# Patient Record
(demographics unavailable — no encounter records)

---

## 2024-11-15 NOTE — HISTORY OF PRESENT ILLNESS
[FreeTextEntry1] : 45-year-old male with a history of hypertension, type 2 diabetes, nonalcoholic steatohepatitis, osteomyelitis, presents for follow-up management of type 2 diabetes mellitus. Prior history of ankle and knee surgeries.  Type 2 diabetes: Patient was initially diagnosed in 2019 with type 2 diabetes, he initially was on homeopathic treatment for diabetes.  A1c trends: A1c February 2022 6.2%, A1c September 2022 to 6%. A1c 8/7/23: 5.6% A1c 1/19/24: 5.5% may 2024 5.7  A1c 8/12/24: 5.5% nov 2024- 5.6  states to july to sept was fasting- lost his grandmas and 45 days did not eat for spiritual reasons  did not take mounjaro at that time  had lows if not eating - this was not related to mounajro   states lowest was 50s  in office = today 66 cgm  had ensure and avocado toast   Current medications: Mounjaro 12.5 mg once weekly. - has gi issues in between, takes herbs, fibers, bloating    Previous Medications: Ozempic when there was a medication shortage with Mounjaro.  Metformin 2000 mg daily. Ozempic was not covered.  Weight trend: 204 pounds--> 186 pounds. -->187lbs  Glucose monitoring: Alfie 99% low 1% intermittent lows noted to 65-70s   Fatty liver: Previously with elevated liver function studies which were repeated and normal.  The patient has seen hepatology in the past and was diagnosed with fatty liver.  His weight is 207 pounds--->203---> 189 pounds.   Diet: Vegetarian. Has made major dietary changes. No more soda, no more half and half.  Ophthalmology exam: No previous  history of diabetic retinopathy, follows annually Hyperlipidemia: Atorvastatin 10 mg daily  Hypertension: Norvasc, patient was previously on losartan but was taken off due to low blood pressure.  he has hx of fatty liver, he had seen GI but its unclear if specifc intervention or workup for fatty liver was discussed i d/w pt to talk to his GI re: this and if not worked up than to be referred to hepatolgist    Family history: Father with coronary artery disease and diabetes mellitus, mother with type 2 diabetes. SH: works at [employer]

## 2024-11-15 NOTE — ASSESSMENT
[Diabetes Foot Care] : diabetes foot care [Long Term Vascular Complications] : long term vascular complications of diabetes [Carbohydrate Consistent Diet] : carbohydrate consistent diet [Self Monitoring of Blood Glucose] : self monitoring of blood glucose [Retinopathy Screening] : Patient was referred to ophthalmology for retinopathy screening [Diabetic Medications] : Risks and benefits of diabetic medications were discussed [FreeTextEntry3] : Risks and benefits of incretin mimetic therapy were discussed with the patient including nausea,  pancreatitis and potential risk of medullary thyroid cancer. MTC seen in rats. no MEN in the family.  Discussed GLP-1 agonists. We discussed the pros and cons of this class of medications, including the benefits of glucose lowering,weight loss, cardiovascular and renal benefits and the warnings for pancreatitis, medullary thyroid cancer, and acute kidney injury.   We discussed the pros and cons of this class of medications, including the benefits of glucose lowering,weight loss, cardiovascular and renal benefits and the warnings for pancreatitis, medullary thyroid cancer, and acute kidney injury. Patient is agreeable with starting a medication of this class. [FreeTextEntry1] : 45-year-old male with a history of hypertension, type 2 diabetes, nonalcoholic steatohepatitis, osteomyelitis, presents for follow-up management of type 2 diabetes mellitus. Prior history of ankle and knee surgeries.  Type 2 diabetes: Patient was initially diagnosed in 2019 with type 2 diabetes    1. Type 2 DM: A1c stable, 5.6 has lows, but not symptomatic     Plan: Mounjaro 12.5 mg once weekly.  we will continue with this for weight loss  BMI 29   Note, the patient previously did not tolerate Ozempic and has significant constipation and bloating. Patient referred to GI for altered bowel habits in the event things do not resolve. Also advised on fiber in the diet to mitigate constipation.   states consitpation better now, has bloating taking herbs for bloating. d/w pt caution with herbs, d/w GI if herbs are safe and OKAY with liver   DM HCM a. HTN  -BP goal - <130/80 -pt is on ace/arb: none - negative urine microalbumin ratio, goal <30 mg/g, negative 2024  -b. HLD - pt is on STATIN: now on Atorvastatin 10mg  c. Ophtho/podiatry follow-up - Ophthalmology annually  2. Vitamin D Deficiency: Level was low as per patient   3. CHANEY/.MASLD d/w pt again today to discuss with his GI re: CHANEY and workup and monitoring for the same  pt will touch base with GI to see them vs hepatology .     4. TSH tsh trend <1 but in normal range follow q6 months  if  remains on lower end consider antibodies  would test off supplements  __________________________________________________________________________________________ Continue GLP for benefit with CHANEY. We discussed the pros and cons of this class of medications, including the benefits of glucose lowering, weight loss, cardiovascular and renal benefits and the warnings for pancreatitis, medullary thyroid cancer, and acute kidney injury. Patient is agreeable with continuing a medication of this class.  pt will have more frequent ophthalmology exam to follow diabetic retinopathy discussed that this is a potential side effect that seen with GLP-1 (not direct cause and effect but if worsening on GLP-1 will need to stop it, dw pt this is more due to potential glucose lowering effect (seen with lantus and insulin as well) and that Diabetes itself is associated with DR if DR is noted, will recommend pt follow closely with optho for monitoring q3-6 months as per optho discretion discussed DR risk with glp1 no MEN or MTC in family or pancreatitis  I counseled that patient that if severe abdominal pain and nausea and vomiting occurs, the pt should stop GLP and go to the ER. We also discussed the more mild side effect of abdominal discomfort/nausea with GLP initiation, which should improve over time. The patient was instructed to let me know if the side effects are intolerable/do not improve.  long discussion on dietary choices and more vegetables in vegetarian south  diet Patient was also made aware of the physiological implications of poor glycemic control (micro and macrovascular complications including but not related to stroke, CAD, retinopathy, renal disease) -Risk of untreated diabetes including vision loss, stroke, heart attack and loss of limbs has been discussed with the pt in detail -targets for weight, A1c and FS have been discussed with pt the importance of checking blood glucose and goals fasting and premeals discussed and how this information can be used to help direct insulin and meal choices diet education; we discussed carb counting, label reading, meal planning, pre-prandial and post-prandial finger stick goals especially in relation to food (for example to check pre and post meal FSBG after the largest meal to better monitor and change dietary choices) -FS goals have been reviewed with the pt in detail: AM <130, premeals< 140, and post meal, 160-180 Exercise and healthy eating has been discussed with the pt and its importance in the management of diabetes -we had a lengthy discussion regarding healthy diet (consistent carbohydrates and low calorie content) with the patient. we also emphasized to maintain routine exercise activity (30 minutes daily or 150 minutes in the week) as tolerated. -we also discussed carbs last- eat vegetables and protein prior to carbs which will help control post prandial glucose rise and can even lower insulin levels.

## 2025-02-10 NOTE — ASSESSMENT
[Diabetes Foot Care] : diabetes foot care [Long Term Vascular Complications] : long term vascular complications of diabetes [Carbohydrate Consistent Diet] : carbohydrate consistent diet [Self Monitoring of Blood Glucose] : self monitoring of blood glucose [Retinopathy Screening] : Patient was referred to ophthalmology for retinopathy screening [Diabetic Medications] : Risks and benefits of diabetic medications were discussed [FreeTextEntry3] : Risks and benefits of incretin mimetic therapy were discussed with the patient including nausea,  pancreatitis and potential risk of medullary thyroid cancer. MTC seen in rats. no MEN in the family.  Discussed GLP-1 agonists. We discussed the pros and cons of this class of medications, including the benefits of glucose lowering,weight loss, cardiovascular and renal benefits and the warnings for pancreatitis, medullary thyroid cancer, and acute kidney injury.   We discussed the pros and cons of this class of medications, including the benefits of glucose lowering,weight loss, cardiovascular and renal benefits and the warnings for pancreatitis, medullary thyroid cancer, and acute kidney injury. Patient is agreeable with starting a medication of this class. [FreeTextEntry1] : 45-year-old male with a history of hypertension, type 2 diabetes, nonalcoholic steatohepatitis, osteomyelitis, presents for follow-up management of type 2 diabetes mellitus. Prior history of ankle and knee surgeries.  Type 2 diabetes: Patient was initially diagnosed in 2019 with type 2 diabetes  1. Type 2 DM: A1c stable, 5.6 11/2024. Due for A1c.   Plan: Increase to Mounjaro 15 mg once weekly.   Note, the patient previously did not tolerate Ozempic and has significant constipation and bloating. Patient referred to GI for altered bowel habits in the event things do not resolve. Also advised on fiber in the diet to mitigate constipation.   Constipation and bloating has improved. Taking herbs for bloating. d/w pt caution with herbs, d/w GI if herbs are safe and okay with liver.  DM HCM a. HTN  -BP goal - <130/80 -pt is on ace/arb: none - negative urine microalbumin ratio, goal <30 mg/g, negative 2024  -b. HLD - pt is on STATIN: now on Atorvastatin 10mg  c. Ophtho/podiatry follow-up - Ophthalmology annually  2. Vitamin D Deficiency: Level was low as per patient  3. CHANEY/.MASLD Previously with elevated liver function studies which were repeated and normal.  The patient has seen hepatology in the past and was diagnosed with fatty liver.   Has had a consult with gastroenterology but unclear if there are any specific interventions or workup for fatty liver was discussed.  Patient will be referred to hepatologist if needed.  4. TSH tsh trend <1 but in normal range follow q6 months  if  remains on lower end consider antibodies  would test off supplements.    I spent over 20 minutes total time with this patient encounter including before, during and after time spent with patient in exam room. Time was spent carefully reviewing the chart in preparing to see patients and performing history & physical exam and ordering appropriate testing and documenting in the medial record.  Additional time was spent counseling the patient regarding my findings and recommendations, recent & current test results as available, any pertinent prior medical records available for review including prior visits, and patients' disease state including risk factor medication. __________________________________________________________________________________________ Continue GLP for benefit with CHANEY. We discussed the pros and cons of this class of medications, including the benefits of glucose lowering, weight loss, cardiovascular and renal benefits and the warnings for pancreatitis, medullary thyroid cancer, and acute kidney injury. Patient is agreeable with continuing a medication of this class.  pt will have more frequent ophthalmology exam to follow diabetic retinopathy discussed that this is a potential side effect that seen with GLP-1 (not direct cause and effect but if worsening on GLP-1 will need to stop it, dw pt this is more due to potential glucose lowering effect (seen with lantus and insulin as well) and that Diabetes itself is associated with DR if DR is noted, will recommend pt follow closely with optho for monitoring q3-6 months as per optho discretion discussed DR risk with glp1 no MEN or MTC in family or pancreatitis  I counseled that patient that if severe abdominal pain and nausea and vomiting occurs, the pt should stop GLP and go to the ER. We also discussed the more mild side effect of abdominal discomfort/nausea with GLP initiation, which should improve over time. The patient was instructed to let me know if the side effects are intolerable/do not improve.  long discussion on dietary choices and more vegetables in vegetarian south  diet Patient was also made aware of the physiological implications of poor glycemic control (micro and macrovascular complications including but not related to stroke, CAD, retinopathy, renal disease) -Risk of untreated diabetes including vision loss, stroke, heart attack and loss of limbs has been discussed with the pt in detail -targets for weight, A1c and FS have been discussed with pt the importance of checking blood glucose and goals fasting and premeals discussed and how this information can be used to help direct insulin and meal choices diet education; we discussed carb counting, label reading, meal planning, pre-prandial and post-prandial finger stick goals especially in relation to food (for example to check pre and post meal FSBG after the largest meal to better monitor and change dietary choices) -FS goals have been reviewed with the pt in detail: AM <130, premeals< 140, and post meal, 160-180 Exercise and healthy eating has been discussed with the pt and its importance in the management of diabetes -we had a lengthy discussion regarding healthy diet (consistent carbohydrates and low calorie content) with the patient. we also emphasized to maintain routine exercise activity (30 minutes daily or 150 minutes in the week) as tolerated. -we also discussed carbs last- eat vegetables and protein prior to carbs which will help control post prandial glucose rise and can even lower insulin levels.

## 2025-02-10 NOTE — HISTORY OF PRESENT ILLNESS
[Home] : at home, [unfilled] , at the time of the visit. [Medical Office: ([facility])___] : at the medical office located in  [Telehealth (audio & video)] : This visit was provided via telehealth using real-time 2-way audio visual technology. [Verbal consent obtained from patient] : the patient, [unfilled] [FreeTextEntry1] : 45-year-old male with a history of hypertension, type 2 diabetes, nonalcoholic steatohepatitis, osteomyelitis, presents for follow-up management of type 2 diabetes mellitus. Prior history of ankle and knee surgeries.  Type 2 diabetes: Patient was initially diagnosed in 2019 with type 2 diabetes, he initially was on homeopathic treatment for diabetes.  A1c trends: A1c February 2022 6.2%, A1c September 2022 to 6%. A1c 8/7/23: 5.6% A1c 1/19/24: 5.5% may 2024 5.7  A1c 8/12/24: 5.5% nov 2024- 5.6 A1c 2/2024: DUE  Current medications: Mounjaro 12.5 mg once weekly. GI symptoms have improved with increasing natural fiber.   Previous Medications: Ozempic when there was a medication shortage with Mounjaro.  Metformin 2000 mg daily. Ozempic was not covered.  Weight trend: 204 pounds--> 186 pounds. -->187lbs--> 200 pounds now. He notes an increase, and that the last weight decrease was due to fasting.   Glucose monitoring: Finished glucose sensor, had malfunctions, now doing fingersticks.   Fatty liver: Previously with elevated liver function studies which were repeated and normal.  The patient has seen hepatology in the past and was diagnosed with fatty liver.   Has had a consult with gastroenterology but unclear if there are any specific interventions or workup for fatty liver was discussed.  Patient will be referred to hepatologist if needed.  Diet: Vegetarian. Has made major dietary changes. No more soda, no more half and half. He has incorporated fiber into diet.   Ophthalmology exam: No previous  history of diabetic retinopathy, follows annually Hyperlipidemia: Atorvastatin 10 mg daily  Hypertension: Norvasc, patient was previously on losartan but was taken off due to low blood pressure.  Family history: Father with coronary artery disease and diabetes mellitus, mother with type 2 diabetes. SH: works at [employer]

## 2025-05-09 NOTE — PHYSICAL EXAM
[Alert] : alert [No Respiratory Distress] : no respiratory distress [Normal Rate and Effort] : normal respiratory rate and effort [Clear to Auscultation] : lungs were clear to auscultation bilaterally [Normal S1, S2] : normal S1 and S2 [Normal Rate] : heart rate was normal [Regular Rhythm] : with a regular rhythm [Normal Gait] : normal gait [No Motor Deficits] : the motor exam was normal [No Tremors] : no tremors [Oriented x3] : oriented to person, place, and time [Normal Affect] : the affect was normal [Normal Insight/Judgement] : insight and judgment were intact [Normal Mood] : the mood was normal

## 2025-05-09 NOTE — ASSESSMENT
[Diabetes Foot Care] : diabetes foot care [Long Term Vascular Complications] : long term vascular complications of diabetes [Carbohydrate Consistent Diet] : carbohydrate consistent diet [Self Monitoring of Blood Glucose] : self monitoring of blood glucose [Retinopathy Screening] : Patient was referred to ophthalmology for retinopathy screening [Diabetic Medications] : Risks and benefits of diabetic medications were discussed [FreeTextEntry3] : Risks and benefits of incretin mimetic therapy were discussed with the patient including nausea,  pancreatitis and potential risk of medullary thyroid cancer. MTC seen in rats. no MEN in the family.  Discussed GLP-1 agonists. We discussed the pros and cons of this class of medications, including the benefits of glucose lowering,weight loss, cardiovascular and renal benefits and the warnings for pancreatitis, medullary thyroid cancer, and acute kidney injury.   We discussed the pros and cons of this class of medications, including the benefits of glucose lowering,weight loss, cardiovascular and renal benefits and the warnings for pancreatitis, medullary thyroid cancer, and acute kidney injury. Patient is agreeable with starting a medication of this class. [FreeTextEntry1] : 45-year-old male with a history of hypertension, type 2 diabetes, nonalcoholic steatohepatitis, osteomyelitis, presents for follow-up management of type 2 diabetes mellitus. Prior history of ankle and knee surgeries.  Type 2 diabetes: Patient was initially diagnosed in 2019 with type 2 diabetes  1. Type 2 DM: A1c stable, 5.6 11/2024. 5.7 may 2025  Plan: can c/w mounajro 15 for now though is to fast for 3 months every pther day given the tight control that is already seen on CGM with lows bc of the fast for 3 months will lower dose by half for now and he will let me know how he is doing   Note, the patient previously did not tolerate Ozempic and has significant constipation and bloating. Patient referred to GI for altered bowel habits in the event things do not resolve. Also advised on fiber in the diet to mitigate constipation.   Constipation and bloating have improved.  add in exercise - we spoke about seeing  and adding in movement and guided exercise  add in weights as possible   DM HCM a. HTN  -BP goal - <130/80 -pt is on ace/arb: none - negative urine microalbumin ratio, goal <30 mg/g, negative 2024  -b. HLD - pt is on STATIN: now on Atorvastatin 10mg  c. Ophtho/podiatry follow-up - Ophthalmology annually   2. CHANEY/.MASLD Previously with elevated liver function studies which were repeated and normal.  The patient has seen hepatology in the past and was diagnosed with fatty liver.   Has had a consult with gastroenterology but unclear if there are any specific interventions or workup for fatty liver was discussed.   dw pt he has MASH--> and needs to have hepatolgy fu and monitoring   4. TSH tsh trend <1 but in normal range follow q6 months   __________________________________________________________________________________________ Continue GLP for benefit with CHANEY. We discussed the pros and cons of this class of medications, including the benefits of glucose lowering, weight loss, cardiovascular and renal benefits and the warnings for pancreatitis, medullary thyroid cancer, and acute kidney injury. Patient is agreeable with continuing a medication of this class.  pt will have more frequent ophthalmology exam to follow diabetic retinopathy discussed that this is a potential side effect that seen with GLP-1 (not direct cause and effect but if worsening on GLP-1 will need to stop it, dw pt this is more due to potential glucose lowering effect (seen with lantus and insulin as well) and that Diabetes itself is associated with DR if DR is noted, will recommend pt follow closely with optho for monitoring q3-6 months as per optho discretion discussed DR risk with glp1 no MEN or MTC in family or pancreatitis  I counseled that patient that if severe abdominal pain and nausea and vomiting occurs, the pt should stop GLP and go to the ER. We also discussed the more mild side effect of abdominal discomfort/nausea with GLP initiation, which should improve over time. The patient was instructed to let me know if the side effects are intolerable/do not improve.  long discussion on dietary choices and more vegetables in vegetarian south  diet Patient was also made aware of the physiological implications of poor glycemic control (micro and macrovascular complications including but not related to stroke, CAD, retinopathy, renal disease) -Risk of untreated diabetes including vision loss, stroke, heart attack and loss of limbs has been discussed with the pt in detail -targets for weight, A1c and FS have been discussed with pt the importance of checking blood glucose and goals fasting and premeals discussed and how this information can be used to help direct insulin and meal choices diet education; we discussed carb counting, label reading, meal planning, pre-prandial and post-prandial finger stick goals especially in relation to food (for example to check pre and post meal FSBG after the largest meal to better monitor and change dietary choices) -FS goals have been reviewed with the pt in detail: AM <130, premeals< 140, and post meal, 160-180 Exercise and healthy eating has been discussed with the pt and its importance in the management of diabetes -we had a lengthy discussion regarding healthy diet (consistent carbohydrates and low calorie content) with the patient. we also emphasized to maintain routine exercise activity (30 minutes daily or 150 minutes in the week) as tolerated. -we also discussed carbs last- eat vegetables and protein prior to carbs which will help control post prandial glucose rise and can even lower insulin levels.

## 2025-05-09 NOTE — HISTORY OF PRESENT ILLNESS
[FreeTextEntry1] : 45-year-old male with a history of hypertension, type 2 diabetes, nonalcoholic steatohepatitis, osteomyelitis, presents for follow-up management of type 2 diabetes mellitus. Prior history of ankle and knee surgeries.  Type 2 diabetes: Patient was initially diagnosed in 2019 with type 2 diabetes, he initially was on homeopathic treatment for diabetes.  A1c trends: A1c February 2022 6.2%, A1c September 2022 to 6%. A1c 8/7/23: 5.6% nov 2024- 5.6 may 2025 5.6  Previous Medications: Ozempic when there was a medication shortage with Mounjaro.  Metformin 2000 mg daily. Ozempic was not covered.  Current medications: Mounjaro 12.5 mg once weekly. GI symptoms have improved with increasing natural fiber.     Weight trend: 204 pounds--> 186 pounds. -->187lbs--> 200---> 187--->193lbs now   Glucose monitoring: FSL 98% in range low 2% high 0% reviewed and has lows noted in 50-60s  he states FSBG is not low when checking     Diet: Vegetarian. Has made major dietary changes. No more soda, no more half and half. He has incorporated fiber into diet.     HCM Hyperlipidemia: Atorvastatin 10 mg daily  Hypertension: Norvasc 2.5, not on losartan - taken off in pat  neg mc/cr ration in 2024 Ophthalmology exam: history of diabetic retinopathy, follows annually oct 2024 per pt normal      Fatty liver: Previously with elevated liver function studies which were repeated and normal. MASLD. Recent CT in 2/2024 with steatosis and mild hepatomegaly.  The patient has seen hepatology in the past and was diagnosed with fatty liver.   Has had a consult with gastroenterology but unclear if there are any specific interventions or workup for fatty liver was discussed.   d/w pt today to please see hepatolgy      thyroid has TSH that ranges in 0.9 -0.7 range   Family history: Father with coronary artery disease and diabetes mellitus, mother with type 2 diabetes. SH: works at [employer]